# Patient Record
Sex: MALE | Race: BLACK OR AFRICAN AMERICAN | NOT HISPANIC OR LATINO | Employment: OTHER | URBAN - METROPOLITAN AREA
[De-identification: names, ages, dates, MRNs, and addresses within clinical notes are randomized per-mention and may not be internally consistent; named-entity substitution may affect disease eponyms.]

---

## 2021-01-19 PROBLEM — G93.40 ACUTE ENCEPHALOPATHY: Status: ACTIVE | Noted: 2019-10-27

## 2021-01-19 PROBLEM — E11.65 TYPE 2 DIABETES MELLITUS WITH HYPERGLYCEMIA: Status: ACTIVE | Noted: 2021-01-19

## 2021-01-19 PROBLEM — I63.9 CVA (CEREBRAL VASCULAR ACCIDENT): Status: ACTIVE | Noted: 2019-10-16

## 2021-01-19 PROBLEM — G40.909 SEIZURE DISORDER: Status: ACTIVE | Noted: 2021-01-19

## 2021-01-19 PROBLEM — Z93.1 S/P PERCUTANEOUS ENDOSCOPIC GASTROSTOMY (PEG) TUBE PLACEMENT: Status: ACTIVE | Noted: 2021-01-19

## 2021-01-19 PROBLEM — I10 ESSENTIAL HYPERTENSION: Status: ACTIVE | Noted: 2019-10-26

## 2021-01-19 PROBLEM — K56.2 VOLVULUS: Status: ACTIVE | Noted: 2021-01-19

## 2021-01-19 PROBLEM — J96.01 ACUTE RESPIRATORY FAILURE WITH HYPOXIA: Status: ACTIVE | Noted: 2021-01-19

## 2021-01-21 PROBLEM — M89.9 LYTIC BONE LESION OF FEMUR: Status: ACTIVE | Noted: 2021-01-21

## 2021-01-21 PROBLEM — K63.9 SIGMOID THICKENING: Status: ACTIVE | Noted: 2021-01-21

## 2021-01-21 PROBLEM — M89.8X5 LYTIC BONE LESION OF FEMUR: Status: ACTIVE | Noted: 2021-01-21

## 2024-03-04 ENCOUNTER — HOSPITAL ENCOUNTER (INPATIENT)
Facility: HOSPITAL | Age: 69
LOS: 2 days | Discharge: HOME HEALTH | DRG: 178 | End: 2024-03-06
Attending: STUDENT IN AN ORGANIZED HEALTH CARE EDUCATION/TRAINING PROGRAM | Admitting: INTERNAL MEDICINE
Payer: MEDICARE

## 2024-03-04 DIAGNOSIS — R06.02 SOB (SHORTNESS OF BREATH): ICD-10-CM

## 2024-03-04 DIAGNOSIS — J18.9 PNEUMONIA: ICD-10-CM

## 2024-03-04 DIAGNOSIS — I82.409 DVT (DEEP VENOUS THROMBOSIS): ICD-10-CM

## 2024-03-04 DIAGNOSIS — R09.02 HYPOXIA: ICD-10-CM

## 2024-03-04 DIAGNOSIS — J69.0 ASPIRATION PNEUMONIA, UNSPECIFIED ASPIRATION PNEUMONIA TYPE, UNSPECIFIED LATERALITY, UNSPECIFIED PART OF LUNG: ICD-10-CM

## 2024-03-04 DIAGNOSIS — J96.01 ACUTE RESPIRATORY FAILURE WITH HYPOXIA: Primary | ICD-10-CM

## 2024-03-04 LAB
ALBUMIN SERPL-MCNC: 3.2 G/DL (ref 3.4–4.8)
ALBUMIN/GLOB SERPL: 0.7 RATIO (ref 1.1–2)
ALP SERPL-CCNC: 216 UNIT/L (ref 40–150)
ALT SERPL-CCNC: 69 UNIT/L (ref 0–55)
APPEARANCE UR: CLEAR
APTT PPP: 29.6 SECONDS (ref 23.2–33.7)
AST SERPL-CCNC: 44 UNIT/L (ref 5–34)
BACTERIA #/AREA URNS AUTO: ABNORMAL /HPF
BASOPHILS # BLD AUTO: 0.06 X10(3)/MCL
BASOPHILS NFR BLD AUTO: 0.5 %
BILIRUB SERPL-MCNC: 0.9 MG/DL
BILIRUB UR QL STRIP.AUTO: NEGATIVE
BNP BLD-MCNC: 105.6 PG/ML
BUN SERPL-MCNC: 14.7 MG/DL (ref 8.4–25.7)
C DIFF TOX A+B STL QL IA: NEGATIVE
CALCIUM SERPL-MCNC: 8.9 MG/DL (ref 8.8–10)
CHLORIDE SERPL-SCNC: 99 MMOL/L (ref 98–107)
CLOSTRIDIUM DIFFICILE GDH ANTIGEN (OHS): NEGATIVE
CO2 SERPL-SCNC: 22 MMOL/L (ref 23–31)
COLOR UR AUTO: YELLOW
CREAT SERPL-MCNC: 0.82 MG/DL (ref 0.73–1.18)
EOSINOPHIL # BLD AUTO: 0.19 X10(3)/MCL (ref 0–0.9)
EOSINOPHIL NFR BLD AUTO: 1.5 %
ERYTHROCYTE [DISTWIDTH] IN BLOOD BY AUTOMATED COUNT: 14.6 % (ref 11.5–17)
EST. AVERAGE GLUCOSE BLD GHB EST-MCNC: 142.7 MG/DL
FECAL LEUKOCYTE (OHS): NEGATIVE
FLUAV AG UPPER RESP QL IA.RAPID: NOT DETECTED
FLUBV AG UPPER RESP QL IA.RAPID: NOT DETECTED
GFR SERPLBLD CREATININE-BSD FMLA CKD-EPI: >60 MLS/MIN/1.73/M2
GLOBULIN SER-MCNC: 4.4 GM/DL (ref 2.4–3.5)
GLUCOSE SERPL-MCNC: 233 MG/DL (ref 82–115)
GLUCOSE UR QL STRIP.AUTO: NORMAL
HBA1C MFR BLD: 6.6 %
HCT VFR BLD AUTO: 46.3 % (ref 42–52)
HGB BLD-MCNC: 14.5 G/DL (ref 14–18)
IMM GRANULOCYTES # BLD AUTO: 0.04 X10(3)/MCL (ref 0–0.04)
IMM GRANULOCYTES NFR BLD AUTO: 0.3 %
INR PPP: 1.2
KETONES UR QL STRIP.AUTO: NEGATIVE
LEUKOCYTE ESTERASE UR QL STRIP.AUTO: NEGATIVE
LYMPHOCYTES # BLD AUTO: 0.56 X10(3)/MCL (ref 0.6–4.6)
LYMPHOCYTES NFR BLD AUTO: 4.4 %
MCH RBC QN AUTO: 25.9 PG (ref 27–31)
MCHC RBC AUTO-ENTMCNC: 31.3 G/DL (ref 33–36)
MCV RBC AUTO: 82.8 FL (ref 80–94)
MONOCYTES # BLD AUTO: 0.83 X10(3)/MCL (ref 0.1–1.3)
MONOCYTES NFR BLD AUTO: 6.6 %
MRSA PCR SCRN (OHS): NOT DETECTED
NEUTROPHILS # BLD AUTO: 10.92 X10(3)/MCL (ref 2.1–9.2)
NEUTROPHILS NFR BLD AUTO: 86.7 %
NITRITE UR QL STRIP.AUTO: NEGATIVE
NRBC BLD AUTO-RTO: 0 %
PH UR STRIP.AUTO: 6 [PH]
PLATELET # BLD AUTO: 240 X10(3)/MCL (ref 130–400)
PMV BLD AUTO: 10.2 FL (ref 7.4–10.4)
POCT GLUCOSE: 233 MG/DL (ref 70–110)
POTASSIUM SERPL-SCNC: 4.2 MMOL/L (ref 3.5–5.1)
PROT SERPL-MCNC: 7.6 GM/DL (ref 5.8–7.6)
PROT UR QL STRIP.AUTO: ABNORMAL
PROTHROMBIN TIME: 14.9 SECONDS (ref 12.5–14.5)
RBC # BLD AUTO: 5.59 X10(6)/MCL (ref 4.7–6.1)
RBC #/AREA URNS AUTO: ABNORMAL /HPF
RBC UR QL AUTO: NEGATIVE
SARS-COV-2 RNA RESP QL NAA+PROBE: NOT DETECTED
SODIUM SERPL-SCNC: 135 MMOL/L (ref 136–145)
SP GR UR STRIP.AUTO: 1.02 (ref 1–1.03)
SQUAMOUS #/AREA URNS LPF: ABNORMAL /HPF
UROBILINOGEN UR STRIP-ACNC: NORMAL
WBC # SPEC AUTO: 12.6 X10(3)/MCL (ref 4.5–11.5)
WBC #/AREA URNS AUTO: ABNORMAL /HPF

## 2024-03-04 PROCEDURE — 96374 THER/PROPH/DIAG INJ IV PUSH: CPT

## 2024-03-04 PROCEDURE — 89055 LEUKOCYTE ASSESSMENT FECAL: CPT | Performed by: INTERNAL MEDICINE

## 2024-03-04 PROCEDURE — 25000003 PHARM REV CODE 250: Performed by: STUDENT IN AN ORGANIZED HEALTH CARE EDUCATION/TRAINING PROGRAM

## 2024-03-04 PROCEDURE — 63600175 PHARM REV CODE 636 W HCPCS: Performed by: STUDENT IN AN ORGANIZED HEALTH CARE EDUCATION/TRAINING PROGRAM

## 2024-03-04 PROCEDURE — 25000003 PHARM REV CODE 250: Performed by: NURSE PRACTITIONER

## 2024-03-04 PROCEDURE — 85025 COMPLETE CBC W/AUTO DIFF WBC: CPT | Performed by: STUDENT IN AN ORGANIZED HEALTH CARE EDUCATION/TRAINING PROGRAM

## 2024-03-04 PROCEDURE — 85610 PROTHROMBIN TIME: CPT | Performed by: STUDENT IN AN ORGANIZED HEALTH CARE EDUCATION/TRAINING PROGRAM

## 2024-03-04 PROCEDURE — 81001 URINALYSIS AUTO W/SCOPE: CPT | Performed by: STUDENT IN AN ORGANIZED HEALTH CARE EDUCATION/TRAINING PROGRAM

## 2024-03-04 PROCEDURE — 96361 HYDRATE IV INFUSION ADD-ON: CPT

## 2024-03-04 PROCEDURE — 0240U COVID/FLU A&B PCR: CPT | Performed by: STUDENT IN AN ORGANIZED HEALTH CARE EDUCATION/TRAINING PROGRAM

## 2024-03-04 PROCEDURE — 83036 HEMOGLOBIN GLYCOSYLATED A1C: CPT | Performed by: NURSE PRACTITIONER

## 2024-03-04 PROCEDURE — 80053 COMPREHEN METABOLIC PANEL: CPT | Performed by: STUDENT IN AN ORGANIZED HEALTH CARE EDUCATION/TRAINING PROGRAM

## 2024-03-04 PROCEDURE — 99285 EMERGENCY DEPT VISIT HI MDM: CPT | Mod: 25

## 2024-03-04 PROCEDURE — 27000207 HC ISOLATION

## 2024-03-04 PROCEDURE — 86318 IA INFECTIOUS AGENT ANTIBODY: CPT | Performed by: INTERNAL MEDICINE

## 2024-03-04 PROCEDURE — 83880 ASSAY OF NATRIURETIC PEPTIDE: CPT | Performed by: STUDENT IN AN ORGANIZED HEALTH CARE EDUCATION/TRAINING PROGRAM

## 2024-03-04 PROCEDURE — 87641 MR-STAPH DNA AMP PROBE: CPT | Performed by: NURSE PRACTITIONER

## 2024-03-04 PROCEDURE — 21400001 HC TELEMETRY ROOM

## 2024-03-04 PROCEDURE — 87040 BLOOD CULTURE FOR BACTERIA: CPT | Performed by: STUDENT IN AN ORGANIZED HEALTH CARE EDUCATION/TRAINING PROGRAM

## 2024-03-04 PROCEDURE — 87633 RESP VIRUS 12-25 TARGETS: CPT | Performed by: INTERNAL MEDICINE

## 2024-03-04 PROCEDURE — 63600175 PHARM REV CODE 636 W HCPCS: Performed by: INTERNAL MEDICINE

## 2024-03-04 PROCEDURE — 63600175 PHARM REV CODE 636 W HCPCS: Performed by: NURSE PRACTITIONER

## 2024-03-04 PROCEDURE — 11000001 HC ACUTE MED/SURG PRIVATE ROOM

## 2024-03-04 PROCEDURE — 85730 THROMBOPLASTIN TIME PARTIAL: CPT | Performed by: STUDENT IN AN ORGANIZED HEALTH CARE EDUCATION/TRAINING PROGRAM

## 2024-03-04 PROCEDURE — 25000003 PHARM REV CODE 250: Performed by: INTERNAL MEDICINE

## 2024-03-04 RX ORDER — LEVETIRACETAM 100 MG/ML
1000 SOLUTION ORAL 2 TIMES DAILY
Status: DISCONTINUED | OUTPATIENT
Start: 2024-03-04 | End: 2024-03-04

## 2024-03-04 RX ORDER — ASCORBIC ACID 500 MG
500 TABLET ORAL DAILY
COMMUNITY

## 2024-03-04 RX ORDER — ONDANSETRON HYDROCHLORIDE 2 MG/ML
4 INJECTION, SOLUTION INTRAVENOUS
Status: COMPLETED | OUTPATIENT
Start: 2024-03-04 | End: 2024-03-04

## 2024-03-04 RX ORDER — SODIUM CHLORIDE 9 MG/ML
INJECTION, SOLUTION INTRAVENOUS CONTINUOUS
Status: DISCONTINUED | OUTPATIENT
Start: 2024-03-04 | End: 2024-03-06 | Stop reason: HOSPADM

## 2024-03-04 RX ORDER — ACETAMINOPHEN 325 MG/1
650 TABLET ORAL EVERY 8 HOURS PRN
Status: DISCONTINUED | OUTPATIENT
Start: 2024-03-04 | End: 2024-03-06 | Stop reason: HOSPADM

## 2024-03-04 RX ORDER — ZINC GLUCONATE 50 MG
50 TABLET ORAL DAILY
COMMUNITY

## 2024-03-04 RX ORDER — ENOXAPARIN SODIUM 100 MG/ML
40 INJECTION SUBCUTANEOUS EVERY 24 HOURS
Status: DISCONTINUED | OUTPATIENT
Start: 2024-03-04 | End: 2024-03-06 | Stop reason: HOSPADM

## 2024-03-04 RX ORDER — ONDANSETRON HYDROCHLORIDE 2 MG/ML
4 INJECTION, SOLUTION INTRAVENOUS EVERY 8 HOURS PRN
Status: DISCONTINUED | OUTPATIENT
Start: 2024-03-04 | End: 2024-03-06 | Stop reason: HOSPADM

## 2024-03-04 RX ORDER — LANSOPRAZOLE 30 MG/1
30 TABLET, ORALLY DISINTEGRATING, DELAYED RELEASE ORAL DAILY
COMMUNITY

## 2024-03-04 RX ORDER — POLYETHYLENE GLYCOL 3350 17 G/17G
17 POWDER, FOR SOLUTION ORAL 2 TIMES DAILY PRN
Status: DISCONTINUED | OUTPATIENT
Start: 2024-03-04 | End: 2024-03-06 | Stop reason: HOSPADM

## 2024-03-04 RX ORDER — ACETAMINOPHEN 325 MG/1
650 TABLET ORAL EVERY 4 HOURS PRN
Status: DISCONTINUED | OUTPATIENT
Start: 2024-03-04 | End: 2024-03-06 | Stop reason: HOSPADM

## 2024-03-04 RX ORDER — INSULIN ASPART 100 [IU]/ML
1-10 INJECTION, SOLUTION INTRAVENOUS; SUBCUTANEOUS EVERY 6 HOURS PRN
Status: DISCONTINUED | OUTPATIENT
Start: 2024-03-04 | End: 2024-03-06 | Stop reason: HOSPADM

## 2024-03-04 RX ORDER — LEVETIRACETAM 10 MG/ML
1000 INJECTION INTRAVASCULAR EVERY 12 HOURS
Status: DISCONTINUED | OUTPATIENT
Start: 2024-03-04 | End: 2024-03-06 | Stop reason: HOSPADM

## 2024-03-04 RX ORDER — GLUCAGON 1 MG
1 KIT INJECTION
Status: DISCONTINUED | OUTPATIENT
Start: 2024-03-04 | End: 2024-03-06 | Stop reason: HOSPADM

## 2024-03-04 RX ADMIN — SODIUM CHLORIDE, POTASSIUM CHLORIDE, SODIUM LACTATE AND CALCIUM CHLORIDE 1000 ML: 600; 310; 30; 20 INJECTION, SOLUTION INTRAVENOUS at 12:03

## 2024-03-04 RX ADMIN — ONDANSETRON 4 MG: 2 INJECTION INTRAMUSCULAR; INTRAVENOUS at 02:03

## 2024-03-04 RX ADMIN — PIPERACILLIN AND TAZOBACTAM 4.5 G: 4; .5 INJECTION, POWDER, LYOPHILIZED, FOR SOLUTION INTRAVENOUS; PARENTERAL at 02:03

## 2024-03-04 RX ADMIN — VANCOMYCIN HYDROCHLORIDE 2000 MG: 500 INJECTION, POWDER, LYOPHILIZED, FOR SOLUTION INTRAVENOUS at 02:03

## 2024-03-04 RX ADMIN — ENOXAPARIN SODIUM 40 MG: 40 INJECTION SUBCUTANEOUS at 05:03

## 2024-03-04 RX ADMIN — INSULIN ASPART 4 UNITS: 100 INJECTION, SOLUTION INTRAVENOUS; SUBCUTANEOUS at 07:03

## 2024-03-04 RX ADMIN — PIPERACILLIN AND TAZOBACTAM 4.5 G: 4; .5 INJECTION, POWDER, LYOPHILIZED, FOR SOLUTION INTRAVENOUS; PARENTERAL at 10:03

## 2024-03-04 RX ADMIN — SODIUM CHLORIDE: 9 INJECTION, SOLUTION INTRAVENOUS at 03:03

## 2024-03-04 RX ADMIN — LEVETIRACETAM INJECTION 1000 MG: 10 INJECTION INTRAVENOUS at 11:03

## 2024-03-04 NOTE — ED PROVIDER NOTES
Encounter Date: 3/4/2024    SCRIBE #1 NOTE: I, Virginiechhaya Santiago, am scribing for, and in the presence of,  Tunde Hammond MD. I have scribed the following portions of the note - Other sections scribed: HPI, ROS, PE, MDM.       History     Chief Complaint   Patient presents with    Vomiting     Arrives via EMS for n/v that started yesterday. Hx TBI, CVA & quadriplegia. Nonverbal. GCS 7 at baseline     68 year old male with a history of TBI, HTN, DM, and seizures presents to ED, via EMS, with his sister (caregiver) who is reporting nausea and vomiting.  Pt's caregiver, at bedside, says that he usually has a pureed diet, but she was out of town and someone else took care of him.  She says that he has vomited up what looks like chunks of scrambled eggs, so she is worried that perhaps his food was not pureed enough.  She is also reporting swollen legs and temp of 100.  She says the patient is nonverbal and does not walk.    The history is provided by a caregiver. The history is limited by the condition of the patient.     Review of patient's allergies indicates:  No Known Allergies  Past Medical History:   Diagnosis Date    Diabetes mellitus     Hypertension     Seizures     Stroke     2019 right sided weakness/aphasia     No past surgical history on file.  No family history on file.  Social History     Tobacco Use    Smoking status: Never    Smokeless tobacco: Never   Substance Use Topics    Alcohol use: Not Currently    Drug use: Not Currently     Review of Systems   Unable to perform ROS: Patient nonverbal       Physical Exam     Initial Vitals [03/04/24 1201]   BP Pulse Resp Temp SpO2   (!) 154/83 102 16 99.5 °F (37.5 °C) 95 %      MAP       --         Physical Exam    Nursing note and vitals reviewed.  Constitutional: He is not diaphoretic. He appears distressed.   Paraplegic    HENT:   Head: Normocephalic and atraumatic.   Eyes: Conjunctivae are normal.   Neck:   Normal range of motion.  Cardiovascular:  Normal  heart sounds and intact distal pulses.           No murmur heard.  Tachycardic   Pulmonary/Chest: He is in respiratory distress. He has no wheezes. He has no rales.   Crackles in bases bilaterally    Abdominal: Abdomen is soft. He exhibits no distension. There is no abdominal tenderness.   Musculoskeletal:         General: No tenderness.      Cervical back: Normal range of motion.      Right lower le+ Edema present.      Left lower le+ Edema present.     Neurological: He is alert.   Skin: Skin is warm and dry. Capillary refill takes less than 2 seconds. No rash noted. No erythema.         ED Course   Critical Care    Date/Time: 3/4/2024 1:15 PM    Performed by: Tunde Hammond MD  Authorized by: Tunde Hammond MD  Direct patient critical care time: 8 minutes  Additional history critical care time: 12 minutes  Ordering / reviewing critical care time: 5 minutes  Documentation critical care time: 6 minutes  Consulting other physicians critical care time: 5 minutes  Total critical care time (exclusive of procedural time) : 36 minutes  Critical care time was exclusive of separately billable procedures and treating other patients and teaching time.  Critical care was necessary to treat or prevent imminent or life-threatening deterioration of the following conditions: respiratory failure.  Critical care was time spent personally by me on the following activities: development of treatment plan with patient or surrogate, discussions with consultants, interpretation of cardiac output measurements, evaluation of patient's response to treatment, examination of patient, obtaining history from patient or surrogate, ordering and performing treatments and interventions, ordering and review of laboratory studies, ordering and review of radiographic studies, pulse oximetry, re-evaluation of patient's condition and review of old charts.        Labs Reviewed   COMPREHENSIVE METABOLIC PANEL - Abnormal; Notable for the  following components:       Result Value    Sodium Level 135 (*)     Carbon Dioxide 22 (*)     Glucose Level 233 (*)     Albumin Level 3.2 (*)     Globulin 4.4 (*)     Albumin/Globulin Ratio 0.7 (*)     Alkaline Phosphatase 216 (*)     Alanine Aminotransferase 69 (*)     Aspartate Aminotransferase 44 (*)     All other components within normal limits   CBC WITH DIFFERENTIAL - Abnormal; Notable for the following components:    WBC 12.60 (*)     MCH 25.9 (*)     MCHC 31.3 (*)     Lymph # 0.56 (*)     Neut # 10.92 (*)     All other components within normal limits   PROTIME-INR - Abnormal; Notable for the following components:    PT 14.9 (*)     All other components within normal limits   URINALYSIS, REFLEX TO URINE CULTURE - Abnormal; Notable for the following components:    Protein, UA Trace (*)     All other components within normal limits   B-TYPE NATRIURETIC PEPTIDE - Abnormal; Notable for the following components:    Natriuretic Peptide 105.6 (*)     All other components within normal limits   POCT GLUCOSE - Abnormal; Notable for the following components:    POCT Glucose 233 (*)     All other components within normal limits   APTT - Normal   COVID/FLU A&B PCR - Normal    Narrative:     The Xpert Xpress SARS-CoV-2/FLU/RSV plus is a rapid, multiplexed real-time PCR test intended for the simultaneous qualitative detection and differentiation of SARS-CoV-2, Influenza A, Influenza B, and respiratory syncytial virus (RSV) viral RNA in either nasopharyngeal swab or nasal swab specimens.         MRSA PCR - Normal    Narrative:     The Xpert MRSA Assay utilizes automated real-time polymerase chain reaction (PCR) to detect MRSA DNA.  A positive test result does not necessarily indicate the presence of viable organism.  It is however, presumptive for the presence of MRSA.   CBC W/ AUTO DIFFERENTIAL    Narrative:     The following orders were created for panel order CBC auto differential.  Procedure                                Abnormality         Status                     ---------                               -----------         ------                     CBC with Differential[[id number]]        Abnormal            Final result                 Please view results for these tests on the individual orders.   HEMOGLOBIN A1C          Imaging Results              US Abdomen Limited_Liver (Final result)  Result time 03/04/24 17:33:30      Final result by Vivek Seymour MD (03/04/24 17:33:30)                   Impression:      Cholelithiasis with no biliary dilatation or sonographic findings for cholecystitis.    Mild hepatomegaly.    Midline structures largely obscured.      Electronically signed by: Vivek Seymour  Date:    03/04/2024  Time:    17:33               Narrative:    EXAMINATION:  US ABDOMEN LIMITED_LIVER    CLINICAL HISTORY:  Abnormal LFTs;    TECHNIQUE:  Gray-scale and color Doppler ultrasound images of the abdomen.    COMPARISON:  CT 01/20/2021    FINDINGS:  Obscured IVC and pancreas.  Portions of the left liver also obscured by bowel gas.    Mild hepatomegaly.  Main portal vein patent with normal flow direction.  Few gallstones.  No gallbladder wall thickening or ascites.  Negative sonographic Torre sign.  Normal CBD caliber.    No hydronephrosis or defined calcification in the right kidney.    Measurements:    - Liver: 17.9 cm    - CBD diameter: 2 mm    - Right kidney: 8.3 cm in length                                       X-Ray Chest AP Portable (Final result)  Result time 03/04/24 13:51:08      Final result by Cornel Cruz MD (03/04/24 13:51:08)                   Impression:      Poor inspiratory effort accentuates the pulmonary and vascular markings.    Confluent opacities in the left retrocardiac region with partial silhouetting of the left hemidiaphragm which might be related to an infiltrate/atelectasis      Electronically signed by: Cornel Cruz  Date:    03/04/2024  Time:    13:51                Narrative:    EXAMINATION:  XR CHEST AP PORTABLE    CLINICAL HISTORY:  Shortness of breath    TECHNIQUE:  Single frontal view of the chest was performed.    COMPARISON:  January 21, 2021    FINDINGS:  Examination reveals mediastinal silhouette to be within normal limits cardiac silhouette is at the upper limits of normal.    There is a poor inspiratory effort that accentuates the pulmonary vascular markings.    Confluent opacities identified in the left retrocardiac region with silhouetting of the left hemidiaphragm which might be related to an infiltrate/atelectasis.    No other focal consolidative changes                                       Medications   lactated ringers bolus 1,000 mL (0 mLs Intravenous Stopped 3/4/24 1330)   ondansetron injection 4 mg (4 mg Intravenous Given 3/4/24 1441)   vancomycin 2 g in dextrose 5 % 500 mL IVPB (0 mg Intravenous Stopped 3/4/24 1600)   piperacillin-tazobactam (ZOSYN) 4.5 g in dextrose 5 % in water (D5W) 100 mL IVPB (MB+) (0 g Intravenous Stopped 3/4/24 1512)     Medical Decision Making  Problems Addressed:  Aspiration pneumonia, unspecified aspiration pneumonia type, unspecified laterality, unspecified part of lung: acute illness or injury that poses a threat to life or bodily functions  Hypoxia: acute illness or injury that poses a threat to life or bodily functions  Pneumonia: acute illness or injury that poses a threat to life or bodily functions  SOB (shortness of breath): acute illness or injury that poses a threat to life or bodily functions    Amount and/or Complexity of Data Reviewed  Independent Historian: caregiver     Details: Pt's caregiver, at bedside, says that he usually has a pureed diet, but she was out of town and someone else took care of him.  She says that he has vomited up what looks like chunks of scrambled eggs, so she is worried that perhaps his food was not pureed enough.  She is also reporting swollen legs and temp of 100.  She says the patient is  nonverbal and does not walk.  Labs: ordered.  Radiology: ordered and independent interpretation performed.  ECG/medicine tests: ordered and independent interpretation performed.    Risk  Prescription drug management.  Parenteral controlled substances.  Decision regarding hospitalization.  Diagnosis or treatment significantly limited by social determinants of health.            Scribe Attestation:   Scribe #1: I performed the above scribed service and the documentation accurately describes the services I performed. I attest to the accuracy of the note.    Attending Attestation:           Physician Attestation for Scribe:  Physician Attestation Statement for Scribe #1: I, Tunde Hammond MD, reviewed documentation, as scribed by Virginie Santiago in my presence, and it is both accurate and complete.             ED Course as of 03/28/24 1802   Mon Mar 04, 2024   1350 Paged [facility] medicine. [BP]      ED Course User Index  [BP] Virginie Santiago               Medical Decision Making:   History:   I obtained history from: someone other than patient.       <> Summary of History: Collateral history obtained from the patient's daughter.  Old Medical Records: I decided to obtain old medical records.  Old Records Summarized: records from clinic visits, records from previous admission(s) and records from another hospital.       <> Summary of Records: Reviewed old records history of CVA, seizures in past  Initial Assessment:   SOB  Differential Diagnosis:   Judging by the patient's chief complaint and pertinent history, the patient has the following possible differential diagnoses, including but not limited to the following.  Some of these are deemed to be lower likelihood and some more likely based on my physical exam and history combined with possible lab work and/or imaging studies.   Please see the pertinent studies, and refer to the HPI.  Some of these diagnoses will take further evaluation to fully rule out, perhaps  as an outpatient and the patient was encouraged to follow up when discharged for more comprehensive evaluation.    Aspiration pneumonia, COVID/Flu, congestive heart failure, asthma, COPD, pleural effusion, pulmonary edema, acute bronchitis, aspiration pneumonia  Independently Interpreted Test(s):   I have ordered and independently interpreted X-rays - see prior notes.  Clinical Tests:   Lab Tests: Ordered and Reviewed  Radiological Study: Ordered and Reviewed  ED Management:  Patient is a 68-year-old male history paraplegia, CVA, seizure disorder presents to the ED for vomiting concern for aspiration pneumonia.  Patient reportedly with temperature of 100° prior to arrival.  On arrival 99.5 he was tachycardic.  Requiring 2 L of oxygen.  Crackles at the bases auscultated.  Chest x-ray obtained which showed infiltrate likely aspiration pneumonia.  Blood cultures obtained started on antibiotics.  All discussed with hospital medicine for admission given that the patient is requiring oxygen.  All results discussed with patient and daughter.  Answered all questions at this time.  Verbalized understanding and agreed to plan.  Other:   I have discussed this case with another health care provider.       <> Summary of the Discussion: Discussed case with hospital medicine who will admit the patient.      ·  There is no evidence of a right lower extremity DVT.  ·  There is no evidence of a left lower extremity DVT.     There was no evidence of deep or superficial vein thrombosis in the bilateral lower extremities.                   Clinical Impression:  Final diagnoses:  [R06.02] SOB (shortness of breath)  [J18.9] Pneumonia  [J69.0] Aspiration pneumonia, unspecified aspiration pneumonia type, unspecified laterality, unspecified part of lung          ED Disposition Condition    Admit Stable                Tunde Hammond MD  03/28/24 8152

## 2024-03-04 NOTE — H&P
[facility]  Hospital Medicine History & Physical Examination       Patient Name: Tito Wells  MRN: [id number]  Patient Class: Emergency   Admission Date: 03/04/2024   Admitting Service: Hospital Medicine   Length of Stay: 0  Attending Physician: Dr. Dior  Primary Care Provider: Yudith Burnett MD  Face-to-Face encounter date: 03/04/2024  Code Status: Full  Chief Complaint: Vomiting (Arrives via EMS for n/v that started yesterday. Hx TBI, CVA & quadriplegia. Nonverbal. GCS 7 at baseline)      Patient information was obtained from patient, patient's family, past medical records and ER records.      HISTORY OF PRESENT ILLNESS:   Tito Wells is a 68 y.o. 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 He is nonverbal and bed bound at baseline.     Upon presentation to ED, vital signed included /83, , RR 16, SpO2 94% on room air, temperature 99.5° F. Labs notable for WBC 12.6, sodium 135, CO2 22, glucose 233, , albumin 3.2, AST 44, ALT 69, .6.  Influenza and COVID-19 negative.  Urinalysis with trace protein.  CXR demonstrated poor inspiratory effort accentuates the pulmonary vascular markings, confluence opacities in the left retrocardiac region with partial silhouetting of the left hemidiaphragm which may be related to an infiltrate/atelectasis.  He was started on broad-spectrum IV antibiotics with vancomycin and Zosyn.  He was admitted to hospital medicine services for further medical management.    REVIEW OF SYSTEMS:   Limited secondary to current clinical condition    PAST MEDICAL HISTORY:   Essential Hypertension  Type 2 DM  Seizure disorder  History of TBI   History of CVA with residual right-sided deficits and aphasia    PAST SURGICAL HISTORY:   PEG tube placement s/p removal  Tracheostomy s/p removal    Colonoscopy    FAMILY HISTORY:   Reviewed and negative    SOCIAL HISTORY:   Denied alcohol, tobacco or illicit drug use    ALLERGIES:   Patient has no known allergies.    HOME MEDICATIONS:     Prior to Admission medications    Medication Sig Start Date End Date Taking? Authorizing Provider   ergocalciferol (ERGOCALCIFEROL) 50,000 unit Cap 1 capsule (50,000 Units total) by Per G Tube route every 7 days. 1/28/21   Zander Sheth MD   hydroCHLOROthiazide (HYDRODIURIL) 25 MG tablet 25 mg by Per G Tube route once daily.    Provider, Historical   levETIRAcetam (KEPPRA) 100 mg/mL Soln 1,000 mg by Per G Tube route 2 (two) times daily.     Provider, Historical   melatonin (MELATIN) 3 mg tablet 2 tablets (6 mg total) by Per G Tube route nightly as needed for Insomnia. 1/22/21   Zander Sheth MD   polyethylene glycol (GLYCOLAX) 17 gram PwPk 17 g by Per G Tube route 3 (three) times daily. 1/22/21   Zander Sheth MD   potassium bicarbonate disintegrating tablet 2 tablets (20 mEq total) by Per G Tube route once daily. 1/22/21   Zander Sheth MD   rivaroxaban (XARELTO) 10 mg Tab 10 mg by Per G Tube route once daily.     Provider, Historical     ________________________________________________________________________  INPATIENT LIST OF MEDICATIONS     Current Facility-Administered Medications:     lactated ringers bolus 1,000 mL, 1,000 mL, Intravenous, ED 1 Time, Tunde Hammond MD    ondansetron injection 4 mg, 4 mg, Intravenous, ED 1 Time, Tunde Hammond MD    piperacillin-tazobactam (ZOSYN) 4.5 g in dextrose 5 % in water (D5W) 100 mL IVPB (MB+), 4.5 g, Intravenous, ED 1 Time, Tunde Hammond MD    piperacillin-tazobactam (ZOSYN) 4.5 g in dextrose 5 % in water (D5W) 100 mL IVPB (MB+), 4.5 g, Intravenous, Q8H, Kiana Clark, [facility]IP-BC    vancomycin 2 g in dextrose 5 % 500 mL IVPB, 2,000 mg, Intravenous, ED 1 Time, Tunde Hammond MD    Current Outpatient Medications:     ergocalciferol (ERGOCALCIFEROL) 50,000  unit Cap, 1 capsule (50,000 Units total) by Per G Tube route every 7 days., Disp: 4 capsule, Rfl: 5    hydroCHLOROthiazide (HYDRODIURIL) 25 MG tablet, 25 mg by Per G Tube route once daily., Disp: , Rfl:     levETIRAcetam (KEPPRA) 100 mg/mL Soln, 1,000 mg by Per G Tube route 2 (two) times daily. , Disp: , Rfl:     melatonin (MELATIN) 3 mg tablet, 2 tablets (6 mg total) by Per G Tube route nightly as needed for Insomnia., Disp:  , Rfl: 0    polyethylene glycol (GLYCOLAX) 17 gram PwPk, 17 g by Per G Tube route 3 (three) times daily., Disp: 100 each, Rfl: 0    potassium bicarbonate disintegrating tablet, 2 tablets (20 mEq total) by Per G Tube route once daily., Disp: 7 tablet, Rfl: 0    rivaroxaban (XARELTO) 10 mg Tab, 10 mg by Per G Tube route once daily. , Disp: , Rfl:     Scheduled Meds:   lactated ringers  1,000 mL Intravenous ED 1 Time    ondansetron  4 mg Intravenous ED 1 Time    piperacillin-tazobactam (Zosyn) IV (PEDS and ADULTS) (extended infusion is not appropriate)  4.5 g Intravenous ED 1 Time    piperacillin-tazobactam (Zosyn) IV (PEDS and ADULTS) (extended infusion is not appropriate)  4.5 g Intravenous Q8H    vancomycin (VANCOCIN) IV (PEDS and ADULTS)  2,000 mg Intravenous ED 1 Time     Continuous Infusions:  PRN Meds:.    PHYSICAL EXAM:     VITAL SIGNS: 24 HRS MIN & MAX LAST   Temp  Min: 99.5 °F (37.5 °C)  Max: 99.5 °F (37.5 °C) 99.5 °F (37.5 °C)   BP  Min: 151/96  Max: 154/83 (!) 151/96   Pulse  Min: 102  Max: 116  (!) 116   Resp  Min: 16  Max: 37 (!) 37   SpO2  Min: 93 %  Max: 95 % (!) 93 %       Physical Exam per attending MD    LABS AND IMAGING:     Recent Labs   Lab 03/04/24  1227   WBC 12.60*   RBC 5.59   HGB 14.5   HCT 46.3   MCV 82.8   MCH 25.9*   MCHC 31.3*   RDW 14.6      MPV 10.2       Recent Labs   Lab 03/04/24  1227   *   K 4.2   CO2 22*   BUN 14.7   CREATININE 0.82   CALCIUM 8.9   ALBUMIN 3.2*   ALKPHOS 216*   ALT 69*   AST 44*   BILITOT 0.9       Microbiology Results (last 7  days)       Procedure Component Value Units Date/Time    Blood culture #1 **CANNOT BE ORDERED STAT** [[id number]] Collected: 03/04/24 1313    Order Status: Sent Specimen: Blood     Blood culture #2 **CANNOT BE ORDERED STAT** [[id number]] Collected: 03/04/24 1313    Order Status: Sent Specimen: Blood              X-Ray Chest AP Portable  Narrative: EXAMINATION:  XR CHEST AP PORTABLE    CLINICAL HISTORY:  Shortness of breath    TECHNIQUE:  Single frontal view of the chest was performed.    COMPARISON:  January 21, 2021    FINDINGS:  Examination reveals mediastinal silhouette to be within normal limits cardiac silhouette is at the upper limits of normal.    There is a poor inspiratory effort that accentuates the pulmonary vascular markings.    Confluent opacities identified in the left retrocardiac region with silhouetting of the left hemidiaphragm which might be related to an infiltrate/atelectasis.    No other focal consolidative changes  Impression: Poor inspiratory effort accentuates the pulmonary and vascular markings.    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      VTE Prophylaxis: Lovenox    Discharge Planning and Disposition: PJ CLINE, Kiana Clark, NP have reviewed and  discussed the case with Dr. Dior.  Please see the attending MD's addendum for further assessment and plan.    Kiana Clark, [facility]-BC  Department of Hospital Medicine   [facility]   03/04/2024    _______________________________________________________________________________  MD Addendum:        I Dr. Beth Dior performed substantive portion of the visit, personally performed a face to face evaluation of the patient and have reviewed and agree with NP/PA documentation, treatment and plan & MDM.     68-year-old  gentleman with above-mentioned medical history was brought to ER by her daughter his daughter due to complaints of fever, shortness of breath.  At baseline he is nonverbal, completely dependent, able to understand.  Peg tube and has been discontinued more than year ago and he is on pureed diet.  Patient's daughter is concerned if he was fed non pureed diet leading to aspiration and shortness of breadth.  At presentation he was tachycardic, T-max 99.5° and was hypoxic requiring oxygen mask ventilation and he was also hypertensive.  Chest x-ray showed confluent opacities in the left retrocardiac region suspicious for infiltrates.  He was fluid resuscitated, empiric antibiotics were added and admitted to hospital medicine service when seen at bedside he was at his baseline mental status able to understand and nod his head during communication.  Daughter was at bedside contributed to HPI.  Trend exam showed bilateral rhonchi with crackles at bases.  Has bilateral lower extremity edema.  Rest of the exam was benign.  Agree with the HPI, examined, plan mentioned above.  We will continue empiric antibiotics, follow infectious workup, obtain speech evaluation, resume home medications, obtain lower extremity Doppler to rule out DVT.  LFTs seems to be elevated chronically.  We will obtain ultrasound liver for further evaluation and avoid hepatotoxic  medications.  We will obtain CT thorax if shortness for breath and hypoxia continues.  Needs new PCP in [place] at discharge            03/04/2024

## 2024-03-04 NOTE — PROGRESS NOTES
"Pharmacokinetic Initial Assessment: IV Vancomycin    Assessment/Plan:    Initiate intravenous vancomycin with loading dose of 2000 mg once followed by a maintenance dose of vancomycin 1750mg IV every 12 hours  Desired empiric serum trough concentration is 15 to 20 mcg/mL  Draw vancomycin trough level 60 min prior to fourth dose on 03/06 at approximately 1600  Pharmacy will continue to follow and monitor vancomycin.      Please contact pharmacy at extension [phone] with any questions regarding this assessment.     Thank you for the consult,   Ana Arevalo       Patient brief summary:  Tito Wells is a 68 y.o. male initiated on antimicrobial therapy with IV Vancomycin for treatment of suspected lower respiratory infection    Drug Allergies:   Review of patient's allergies indicates:  No Known Allergies    Actual Body Weight:   97.1 kg    Renal Function:   Estimated Creatinine Clearance: 102.2 mL/min (based on SCr of 0.82 mg/dL).,     Dialysis Method (if applicable):  N/A    CBC (last 72 hours):  Recent Labs   Lab Result Units 03/04/24  1227   WBC x10(3)/mcL 12.60*   Hgb g/dL 14.5   Hemoglobin A1c % 6.6   Hct % 46.3   Platelet x10(3)/mcL 240   Mono % % 6.6   Eos % % 1.5   Basophil % % 0.5       Metabolic Panel (last 72 hours):  Recent Labs   Lab Result Units 03/04/24  1227 03/04/24  1256   Sodium Level mmol/L 135*  --    Potassium Level mmol/L 4.2  --    Chloride mmol/L 99  --    Carbon Dioxide mmol/L 22*  --    Glucose Level mg/dL 233*  --    Glucose, UA   --  Normal   Blood Urea Nitrogen mg/dL 14.7  --    Creatinine mg/dL 0.82  --    Albumin Level g/dL 3.2*  --    Bilirubin Total mg/dL 0.9  --    Alkaline Phosphatase unit/L 216*  --    Aspartate Aminotransferase unit/L 44*  --    Alanine Aminotransferase unit/L 69*  --        Drug levels (last 3 results):  No results for input(s): "VANCOMYCINRA", "VANCORANDOM", "VANCOMYCINPE", "VANCOPEAK", "VANCOMYCINTR", "VANCOTROUGH" in the last 72 hours.    Microbiologic " Results:  Microbiology Results (last 7 days)       Procedure Component Value Units Date/Time    Blood culture #1 **CANNOT BE ORDERED STAT** [[id number]] Collected: 03/04/24 1313    Order Status: Sent Specimen: Blood     Blood culture #2 **CANNOT BE ORDERED STAT** [[id number]] Collected: 03/04/24 1313    Order Status: Sent Specimen: Blood

## 2024-03-04 NOTE — Clinical Note
Diagnosis: Pneumonia [311602]   Future Attending Provider: ELIZABETH ESTES [[id number]]   Admit to which facility:: [facility] [[id number]]   Reason for IP Medical Treatment  (Clinical interventions that can only be accomplished in the IP setting? ) :: Pneumonia   I certify that Inpatient services for greater than or equal to 2 midnights are medically necessary:: Yes   Plans for Post-Acute care--if anticipated (pick the single best option):: A. No post acute care anticipated at this time

## 2024-03-05 LAB
ALBUMIN SERPL-MCNC: 2.8 G/DL (ref 3.4–4.8)
ALBUMIN/GLOB SERPL: 0.7 RATIO (ref 1.1–2)
ALP SERPL-CCNC: 169 UNIT/L (ref 40–150)
ALT SERPL-CCNC: 54 UNIT/L (ref 0–55)
AST SERPL-CCNC: 35 UNIT/L (ref 5–34)
B PERT.PT PRMT NPH QL NAA+NON-PROBE: NOT DETECTED
BASOPHILS # BLD AUTO: 0.03 X10(3)/MCL
BASOPHILS NFR BLD AUTO: 0.4 %
BILIRUB SERPL-MCNC: 0.8 MG/DL
BUN SERPL-MCNC: 13 MG/DL (ref 8.4–25.7)
C PNEUM DNA NPH QL NAA+NON-PROBE: NOT DETECTED
CALCIUM SERPL-MCNC: 8.5 MG/DL (ref 8.8–10)
CHLORIDE SERPL-SCNC: 104 MMOL/L (ref 98–107)
CHOLEST SERPL-MCNC: 113 MG/DL
CHOLEST/HDLC SERPL: 6 {RATIO} (ref 0–5)
CO2 SERPL-SCNC: 23 MMOL/L (ref 23–31)
CREAT SERPL-MCNC: 0.75 MG/DL (ref 0.73–1.18)
EOSINOPHIL # BLD AUTO: 0.08 X10(3)/MCL (ref 0–0.9)
EOSINOPHIL NFR BLD AUTO: 1 %
ERYTHROCYTE [DISTWIDTH] IN BLOOD BY AUTOMATED COUNT: 14.6 % (ref 11.5–17)
EST. AVERAGE GLUCOSE BLD GHB EST-MCNC: 145.6 MG/DL
GFR SERPLBLD CREATININE-BSD FMLA CKD-EPI: >60 MLS/MIN/1.73/M2
GLOBULIN SER-MCNC: 3.8 GM/DL (ref 2.4–3.5)
GLUCOSE SERPL-MCNC: 140 MG/DL (ref 82–115)
HADV DNA NPH QL NAA+NON-PROBE: NOT DETECTED
HBA1C MFR BLD: 6.7 %
HCOV 229E RNA NPH QL NAA+NON-PROBE: NOT DETECTED
HCOV HKU1 RNA NPH QL NAA+NON-PROBE: NOT DETECTED
HCOV NL63 RNA NPH QL NAA+NON-PROBE: NOT DETECTED
HCOV OC43 RNA NPH QL NAA+NON-PROBE: NOT DETECTED
HCT VFR BLD AUTO: 39.9 % (ref 42–52)
HDLC SERPL-MCNC: 18 MG/DL (ref 35–60)
HGB BLD-MCNC: 12.7 G/DL (ref 14–18)
HMPV RNA NPH QL NAA+NON-PROBE: NOT DETECTED
HPIV1 RNA NPH QL NAA+NON-PROBE: NOT DETECTED
HPIV2 RNA NPH QL NAA+NON-PROBE: NOT DETECTED
HPIV3 RNA NPH QL NAA+NON-PROBE: NOT DETECTED
HPIV4 RNA NPH QL NAA+NON-PROBE: NOT DETECTED
IMM GRANULOCYTES # BLD AUTO: 0.02 X10(3)/MCL (ref 0–0.04)
IMM GRANULOCYTES NFR BLD AUTO: 0.2 %
LDLC SERPL CALC-MCNC: 65 MG/DL (ref 50–140)
LYMPHOCYTES # BLD AUTO: 1.23 X10(3)/MCL (ref 0.6–4.6)
LYMPHOCYTES NFR BLD AUTO: 14.7 %
M PNEUMO DNA NPH QL NAA+NON-PROBE: NOT DETECTED
MCH RBC QN AUTO: 25.8 PG (ref 27–31)
MCHC RBC AUTO-ENTMCNC: 31.8 G/DL (ref 33–36)
MCV RBC AUTO: 81.1 FL (ref 80–94)
MONOCYTES # BLD AUTO: 0.93 X10(3)/MCL (ref 0.1–1.3)
MONOCYTES NFR BLD AUTO: 11.1 %
NEUTROPHILS # BLD AUTO: 6.1 X10(3)/MCL (ref 2.1–9.2)
NEUTROPHILS NFR BLD AUTO: 72.6 %
NRBC BLD AUTO-RTO: 0 %
PLATELET # BLD AUTO: 205 X10(3)/MCL (ref 130–400)
PMV BLD AUTO: 10.6 FL (ref 7.4–10.4)
POCT GLUCOSE: 172 MG/DL (ref 70–110)
POTASSIUM SERPL-SCNC: 4 MMOL/L (ref 3.5–5.1)
PROT SERPL-MCNC: 6.6 GM/DL (ref 5.8–7.6)
RBC # BLD AUTO: 4.92 X10(6)/MCL (ref 4.7–6.1)
RSV RNA NPH QL NAA+NON-PROBE: NOT DETECTED
RV+EV RNA NPH QL NAA+NON-PROBE: NOT DETECTED
SODIUM SERPL-SCNC: 139 MMOL/L (ref 136–145)
TRIGL SERPL-MCNC: 151 MG/DL (ref 34–140)
VLDLC SERPL CALC-MCNC: 30 MG/DL
WBC # SPEC AUTO: 8.39 X10(3)/MCL (ref 4.5–11.5)

## 2024-03-05 PROCEDURE — 63600175 PHARM REV CODE 636 W HCPCS: Performed by: INTERNAL MEDICINE

## 2024-03-05 PROCEDURE — 63600175 PHARM REV CODE 636 W HCPCS: Performed by: NURSE PRACTITIONER

## 2024-03-05 PROCEDURE — 80061 LIPID PANEL: CPT | Performed by: INTERNAL MEDICINE

## 2024-03-05 PROCEDURE — 25000003 PHARM REV CODE 250: Performed by: NURSE PRACTITIONER

## 2024-03-05 PROCEDURE — 83036 HEMOGLOBIN GLYCOSYLATED A1C: CPT | Performed by: INTERNAL MEDICINE

## 2024-03-05 PROCEDURE — 80053 COMPREHEN METABOLIC PANEL: CPT | Performed by: NURSE PRACTITIONER

## 2024-03-05 PROCEDURE — 25000003 PHARM REV CODE 250: Performed by: INTERNAL MEDICINE

## 2024-03-05 PROCEDURE — 27000221 HC OXYGEN, UP TO 24 HOURS

## 2024-03-05 PROCEDURE — 85025 COMPLETE CBC W/AUTO DIFF WBC: CPT | Performed by: NURSE PRACTITIONER

## 2024-03-05 PROCEDURE — 21400001 HC TELEMETRY ROOM

## 2024-03-05 PROCEDURE — 92610 EVALUATE SWALLOWING FUNCTION: CPT

## 2024-03-05 RX ORDER — LOPERAMIDE HYDROCHLORIDE 2 MG/1
2 CAPSULE ORAL 4 TIMES DAILY PRN
Status: DISCONTINUED | OUTPATIENT
Start: 2024-03-05 | End: 2024-03-06 | Stop reason: HOSPADM

## 2024-03-05 RX ADMIN — VANCOMYCIN HYDROCHLORIDE 1750 MG: 500 INJECTION, POWDER, LYOPHILIZED, FOR SOLUTION INTRAVENOUS at 05:03

## 2024-03-05 RX ADMIN — LOPERAMIDE HYDROCHLORIDE 2 MG: 2 CAPSULE ORAL at 04:03

## 2024-03-05 RX ADMIN — PIPERACILLIN AND TAZOBACTAM 4.5 G: 4; .5 INJECTION, POWDER, LYOPHILIZED, FOR SOLUTION INTRAVENOUS; PARENTERAL at 04:03

## 2024-03-05 RX ADMIN — LEVETIRACETAM INJECTION 1000 MG: 10 INJECTION INTRAVENOUS at 08:03

## 2024-03-05 RX ADMIN — Medication: at 04:03

## 2024-03-05 RX ADMIN — WHITE PETROLATUM: 1.75 OINTMENT TOPICAL at 04:03

## 2024-03-05 RX ADMIN — LOPERAMIDE HYDROCHLORIDE 2 MG: 2 CAPSULE ORAL at 09:03

## 2024-03-05 RX ADMIN — INSULIN ASPART 2 UNITS: 100 INJECTION, SOLUTION INTRAVENOUS; SUBCUTANEOUS at 04:03

## 2024-03-05 RX ADMIN — ENOXAPARIN SODIUM 40 MG: 40 INJECTION SUBCUTANEOUS at 04:03

## 2024-03-05 RX ADMIN — PIPERACILLIN AND TAZOBACTAM 4.5 G: 4; .5 INJECTION, POWDER, LYOPHILIZED, FOR SOLUTION INTRAVENOUS; PARENTERAL at 09:03

## 2024-03-05 NOTE — PT/OT/SLP EVAL
[facility]  Speech Language Pathology Department  Clinical Swallow Evaluation    Patient Name:  Tito Wells   MRN:  [id number]    Recommendations     General recommendations:  SLP follow up x1  Diet texture/consistency recommendations:  Puree solids (IDDSI 4) and moderately thick liquids (IDDSI 3)  Medications: crushed in puree  Swallow strategies/precautions: small bites/sips, slow rate, and 1:1 assist feed  Precautions: Standard,      History     Tito Wells is a/n 68 y.o. 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 with vomiting and nausea. Pt is nonverbal and bed bound at baseline.     Past Medical History:   Diagnosis Date    Diabetes mellitus     Hypertension     Seizures     Stroke     2019 right sided weakness/aphasia     Home diet texture/consistency: Puree and moderately thick liquids  Current method of nutrition: NPO    Imaging   Results for orders placed during the hospital encounter of 01/19/21    X-Ray Chest 1 View    Narrative  EXAMINATION:  XR CHEST 1 VIEW    CLINICAL HISTORY:  hx of granulomas/ nodules. with lytic lesions on femur, assess for malignancy, possible lung primary;.    COMPARISON:  10/19/2019 at 02:21.    TECHNIQUE:  Frontal view of the chest.    FINDINGS:  There has been interval removal of the ET tube, feeding tube and right internal jugular catheter.  Small calcified granuloma in the left upper lobe.  No evidence of acute confluent infiltrates.  Both costophrenic angles are sharp.  No definite pneumothorax noted trachea is midline.  Calcified aorta.  Cardiac silhouette is within normal limits.  Degenerative changes.  Healed right rib fracture.      Electronically signed by: Jag Dill  Date:    01/22/2021  Time:    09:24    Subjective     Patient awake.Pts daughter at bedside.   Spiritual/Cultural/Zoroastrian Beliefs/Practices that affect care: no  Pain/Comfort: Pain Rating 1: 0/10    Objective     ORAL  MUSCULATURE  Dentition: own teeth  Secretion Management: adequate  Mucosal Quality: good  Facial Movement: WFL  Buccal Strength & Mobility: WFL  Mandibular Strength & Mobility: WFL    Consistency Fed By Oral Symptoms Pharyngeal Symptoms   Moderately thick liquid by cup SLP None None   Puree SLP None None     Assessment     No signs/sx of aspiration observed. REC: initiate moderately thick liquids and puree. Pt will require 1:1 assist feed when caregiver is not present.     Education     Patient provided with verbal education regarding POC.  Understanding was verbalized.    Plan     Plan of Care reviewed with:  patient     Time Tracking     SLP Treatment Date:   03/05/24  Speech Start Time:  0900  Speech Stop Time:  0915     Speech Total Time (min):  15 min    Billable minutes:  Swallow and Oral Function Evaluation, 15 minutes     03/05/2024

## 2024-03-05 NOTE — PLAN OF CARE
03/05/24 1412   Discharge Assessment   Assessment Type Discharge Planning Assessment   Confirmed/corrected address, phone number and insurance Yes   Confirmed Demographics Correct on Facesheet   Source of Information patient   Reason For Admission Pneumonia   People in Home child(andrzej), adult   Facility Arrived From: Home   Do you expect to return to your current living situation? Yes   Do you have help at home or someone to help you manage your care at home? Yes   Who are your caregiver(s) and their phone number(s)? Daughter, Dena Wells, [phone]   Current cognitive status: Unable to Assess   Walking or Climbing Stairs Difficulty yes   Walking or Climbing Stairs ambulation difficulty, dependent;stair climbing difficulty, dependent;transferring difficulty, dependent   Mobility Management Requires lift to transfer, wheelchair bariatric chair, hospital bed   Dressing/Bathing Difficulty yes   Dressing/Bathing bathing difficulty, requires equipment;bathing difficulty, dependent   Dressing/Bathing Management Requires total care wtih all ADL's   Home Accessibility wheelchair accessible   Home Layout Able to live on 1st floor   Patient currently being followed by outpatient case management? No   Do you currently have service(s) that help you manage your care at home? No   Do you take prescription medications? Yes   Do you have prescription coverage? Yes   Coverage MCR   Do you have any problems affording any of your prescribed medications? No   Is the patient taking medications as prescribed? yes   Who is going to help you get home at discharge? DaughterDena   How do you get to doctors appointments? family or friend will provide;health plan transportation   Are you on dialysis? No   Do you take coumadin? No   Discharge Plan A Other  (TBD)   Discharge Plan B Other  (TBD)   DME Needed Upon Discharge  none   Discharge Plan discussed with: Adult children   Name(s) and Number(s) Amrik Wells  [phone]   Physical Activity   On average, how many days per week do you engage in moderate to strenuous exercise (like a brisk walk)? 0 days   On average, how many minutes do you engage in exercise at this level? 0 min   Financial Resource Strain   How hard is it for you to pay for the very basics like food, housing, medical care, and heating? Not hard   Housing Stability   In the last 12 months, was there a time when you were not able to pay the mortgage or rent on time? N   In the last 12 months, how many places have you lived? 2   In the last 12 months, was there a time when you did not have a steady place to sleep or slept in a shelter (including now)? N   Transportation Needs   In the past 12 months, has lack of transportation kept you from medical appointments or from getting medications? yes   In the past 12 months, has lack of transportation kept you from meetings, work, or from getting things needed for daily living? Yes   Food Insecurity   Within the past 12 months, you worried that your food would run out before you got the money to buy more. Never true   Within the past 12 months, the food you bought just didn't last and you didn't have money to get more. Never true   Stress   Do you feel stress - tense, restless, nervous, or anxious, or unable to sleep at night because your mind is troubled all the time - these days? Not at all   Social Connections   In a typical week, how many times do you talk on the phone with family, friends, or neighbors? Pt Unable   How often do you get together with friends or relatives? Pt Unable   How often do you attend Christian or Yazidism services? Never   Do you belong to any clubs or organizations such as Christian groups, unions, fraternal or athletic groups, or school groups? No   How often do you attend meetings of the clubs or organizations you belong to? Never   Are you , , , , never , or living with a partner?    Alcohol  Use   Q1: How often do you have a drink containing alcohol? Never   Q2: How many drinks containing alcohol do you have on a typical day when you are drinking? None   Q3: How often do you have six or more drinks on one occasion? Never   OTHER   Name(s) of People in Home Dena Wells     Patient admitted from home.  Patient requires total care.  Patient was being cared for by his spouse prior to her expiring at end of last year.  Patient is now living with his daughter, Dena Wlels, who provides full time care for him.  His PCP, Yudith Burnett, is still assisting with managing his care until his daughter can set him up with a local PCP.  Daughter states that she is able to provide transportation for him using a lift and wheelchair.  States she attempted medical transport several times and they failed to show up.  Daughter will provide transportation and care for patient when discharged back home.
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
## 2024-03-05 NOTE — NURSING
Nurses Note -- 4 Eyes      3/4/2024   10:29 PM      Skin assessed during: Admit      [] No Altered Skin Integrity Present    []Prevention Measures Documented      [x] Yes- Altered Skin Integrity Present or Discovered   [x] LDA Added if Not in Epic (Describe Wound)   [x] New Altered Skin Integrity was Present on Admit and Documented in LDA   [x] Wound Image Taken    Wound Care Consulted? Yes    Attending Nurse:  Rachael Salvador RN/Staff Member:  Vania

## 2024-03-05 NOTE — PLAN OF CARE
Problem: Adult Inpatient Plan of Care  Goal: Plan of Care Review  Outcome: Ongoing, Progressing  Goal: Patient-Specific Goal (Individualized)  Outcome: Ongoing, Progressing  Goal: Absence of Hospital-Acquired Illness or Injury  Outcome: Ongoing, Progressing  Goal: Optimal Comfort and Wellbeing  Outcome: Ongoing, Progressing  Intervention: Provide Person-Centered Care  Flowsheets (Taken 3/5/2024 0140)  Trust Relationship/Rapport:   care explained   choices provided   emotional support provided   empathic listening provided   questions answered   questions encouraged   reassurance provided   thoughts/feelings acknowledged     Problem: Infection  Goal: Absence of Infection Signs and Symptoms  Outcome: Ongoing, Progressing  Intervention: Prevent or Manage Infection  Flowsheets (Taken 3/5/2024 0140)  Isolation Precautions: precautions discontinued     Problem: Fall Injury Risk  Goal: Absence of Fall and Fall-Related Injury  Outcome: Ongoing, Progressing     Problem: Skin Injury Risk Increased  Goal: Skin Health and Integrity  Outcome: Ongoing, Progressing  Intervention: Optimize Skin Protection  Flowsheets (Taken 3/5/2024 0140)  Pressure Reduction Techniques:   heels elevated off bed   positioned off wounds   weight shift assistance provided  Skin Protection:   adhesive use limited   tubing/devices free from skin contact  Head of Bed (HOB) Positioning: HOB at 30-45 degrees     Problem: Impaired Wound Healing  Goal: Optimal Wound Healing  Outcome: Ongoing, Not Progressing  Intervention: Promote Wound Healing  Flowsheets (Taken 3/5/2024 0140)  Sleep/Rest Enhancement:   awakenings minimized   family presence promoted   regular sleep/rest pattern promoted   room darkened  Activity Management: Rolling - L1

## 2024-03-06 VITALS
HEIGHT: 71 IN | DIASTOLIC BLOOD PRESSURE: 87 MMHG | TEMPERATURE: 98 F | OXYGEN SATURATION: 96 % | RESPIRATION RATE: 20 BRPM | WEIGHT: 214.06 LBS | SYSTOLIC BLOOD PRESSURE: 137 MMHG | BODY MASS INDEX: 29.97 KG/M2 | HEART RATE: 96 BPM

## 2024-03-06 PROBLEM — R06.02 SOB (SHORTNESS OF BREATH): Status: ACTIVE | Noted: 2024-03-06

## 2024-03-06 LAB
ALBUMIN SERPL-MCNC: 2.7 G/DL (ref 3.4–4.8)
ALBUMIN/GLOB SERPL: 0.7 RATIO (ref 1.1–2)
ALP SERPL-CCNC: 162 UNIT/L (ref 40–150)
ALT SERPL-CCNC: 49 UNIT/L (ref 0–55)
AST SERPL-CCNC: 37 UNIT/L (ref 5–34)
BASOPHILS # BLD AUTO: 0.02 X10(3)/MCL
BASOPHILS NFR BLD AUTO: 0.3 %
BILIRUB SERPL-MCNC: 0.5 MG/DL
BUN SERPL-MCNC: 13 MG/DL (ref 8.4–25.7)
CALCIUM SERPL-MCNC: 8.1 MG/DL (ref 8.8–10)
CHLORIDE SERPL-SCNC: 105 MMOL/L (ref 98–107)
CO2 SERPL-SCNC: 23 MMOL/L (ref 23–31)
CREAT SERPL-MCNC: 0.73 MG/DL (ref 0.73–1.18)
EOSINOPHIL # BLD AUTO: 0.18 X10(3)/MCL (ref 0–0.9)
EOSINOPHIL NFR BLD AUTO: 2.6 %
ERYTHROCYTE [DISTWIDTH] IN BLOOD BY AUTOMATED COUNT: 14.6 % (ref 11.5–17)
GFR SERPLBLD CREATININE-BSD FMLA CKD-EPI: >60 MLS/MIN/1.73/M2
GLOBULIN SER-MCNC: 3.8 GM/DL (ref 2.4–3.5)
GLUCOSE SERPL-MCNC: 139 MG/DL (ref 82–115)
HCT VFR BLD AUTO: 38.2 % (ref 42–52)
HGB BLD-MCNC: 12.3 G/DL (ref 14–18)
IMM GRANULOCYTES # BLD AUTO: 0.03 X10(3)/MCL (ref 0–0.04)
IMM GRANULOCYTES NFR BLD AUTO: 0.4 %
LYMPHOCYTES # BLD AUTO: 1.06 X10(3)/MCL (ref 0.6–4.6)
LYMPHOCYTES NFR BLD AUTO: 15.2 %
MAGNESIUM SERPL-MCNC: 2.2 MG/DL (ref 1.6–2.6)
MCH RBC QN AUTO: 26.2 PG (ref 27–31)
MCHC RBC AUTO-ENTMCNC: 32.2 G/DL (ref 33–36)
MCV RBC AUTO: 81.4 FL (ref 80–94)
MONOCYTES # BLD AUTO: 0.63 X10(3)/MCL (ref 0.1–1.3)
MONOCYTES NFR BLD AUTO: 9 %
NEUTROPHILS # BLD AUTO: 5.05 X10(3)/MCL (ref 2.1–9.2)
NEUTROPHILS NFR BLD AUTO: 72.5 %
NRBC BLD AUTO-RTO: 0 %
PLATELET # BLD AUTO: 191 X10(3)/MCL (ref 130–400)
PMV BLD AUTO: 10.8 FL (ref 7.4–10.4)
POCT GLUCOSE: 173 MG/DL (ref 70–110)
POTASSIUM SERPL-SCNC: 4.1 MMOL/L (ref 3.5–5.1)
PROT SERPL-MCNC: 6.5 GM/DL (ref 5.8–7.6)
RBC # BLD AUTO: 4.69 X10(6)/MCL (ref 4.7–6.1)
SODIUM SERPL-SCNC: 138 MMOL/L (ref 136–145)
WBC # SPEC AUTO: 6.97 X10(3)/MCL (ref 4.5–11.5)

## 2024-03-06 PROCEDURE — 63600175 PHARM REV CODE 636 W HCPCS: Performed by: NURSE PRACTITIONER

## 2024-03-06 PROCEDURE — 94761 N-INVAS EAR/PLS OXIMETRY MLT: CPT

## 2024-03-06 PROCEDURE — 80053 COMPREHEN METABOLIC PANEL: CPT | Performed by: INTERNAL MEDICINE

## 2024-03-06 PROCEDURE — 63600175 PHARM REV CODE 636 W HCPCS: Performed by: INTERNAL MEDICINE

## 2024-03-06 PROCEDURE — 25000003 PHARM REV CODE 250: Performed by: NURSE PRACTITIONER

## 2024-03-06 PROCEDURE — 27000221 HC OXYGEN, UP TO 24 HOURS

## 2024-03-06 PROCEDURE — 85025 COMPLETE CBC W/AUTO DIFF WBC: CPT | Performed by: INTERNAL MEDICINE

## 2024-03-06 PROCEDURE — 83735 ASSAY OF MAGNESIUM: CPT | Performed by: INTERNAL MEDICINE

## 2024-03-06 RX ORDER — AMOXICILLIN AND CLAVULANATE POTASSIUM 875; 125 MG/1; MG/1
1 TABLET, FILM COATED ORAL EVERY 12 HOURS
Qty: 14 TABLET | Refills: 0 | Status: SHIPPED | OUTPATIENT
Start: 2024-03-06 | End: 2024-03-13

## 2024-03-06 RX ORDER — LOPERAMIDE HYDROCHLORIDE 2 MG/1
2 CAPSULE ORAL 4 TIMES DAILY PRN
Qty: 10 CAPSULE | Refills: 0 | Status: SHIPPED | OUTPATIENT
Start: 2024-03-06 | End: 2024-03-06

## 2024-03-06 RX ORDER — AMOXICILLIN AND CLAVULANATE POTASSIUM 875; 125 MG/1; MG/1
1 TABLET, FILM COATED ORAL EVERY 12 HOURS
Qty: 14 TABLET | Refills: 0 | Status: SHIPPED | OUTPATIENT
Start: 2024-03-06 | End: 2024-03-06

## 2024-03-06 RX ORDER — LOPERAMIDE HYDROCHLORIDE 2 MG/1
2 CAPSULE ORAL 4 TIMES DAILY PRN
Qty: 5 CAPSULE | Refills: 0 | Status: SHIPPED | OUTPATIENT
Start: 2024-03-06 | End: 2024-03-16

## 2024-03-06 RX ORDER — AMOXICILLIN AND CLAVULANATE POTASSIUM 250; 62.5 MG/5ML; MG/5ML
13.33 POWDER, FOR SUSPENSION ORAL 2 TIMES DAILY
Qty: 187 ML | Refills: 0 | Status: SHIPPED | OUTPATIENT
Start: 2024-03-06 | End: 2024-03-06 | Stop reason: HOSPADM

## 2024-03-06 RX ADMIN — LEVETIRACETAM INJECTION 1000 MG: 10 INJECTION INTRAVENOUS at 09:03

## 2024-03-06 RX ADMIN — PIPERACILLIN AND TAZOBACTAM 4.5 G: 4; .5 INJECTION, POWDER, LYOPHILIZED, FOR SOLUTION INTRAVENOUS; PARENTERAL at 12:03

## 2024-03-06 RX ADMIN — PIPERACILLIN AND TAZOBACTAM 4.5 G: 4; .5 INJECTION, POWDER, LYOPHILIZED, FOR SOLUTION INTRAVENOUS; PARENTERAL at 10:03

## 2024-03-06 NOTE — PLAN OF CARE
03/06/24 1135   Final Note   Assessment Type Final Discharge Note   Anticipated Discharge Disposition Home-Health   Hospital Resources/Appts/Education Provided Provided patient/caregiver with written discharge plan information;Appointments scheduled and added to AVS   Post-Acute Status   Post-Acute Authorization Home Health   Home Health Status Set-up Complete/Auth obtained   Patient choice form signed by patient/caregiver List with quality metrics by geographic area provided   Discharge Delays None known at this time     Patient [facility].  Patient list provided.  Patient choice signed.  Patient's daughter will provide discharge transportation.

## 2024-03-06 NOTE — PLAN OF CARE
Problem: Adult Inpatient Plan of Care  Goal: Plan of Care Review  Outcome: Met  Goal: Patient-Specific Goal (Individualized)  Outcome: Met  Goal: Absence of Hospital-Acquired Illness or Injury  Outcome: Met  Goal: Optimal Comfort and Wellbeing  Outcome: Met  Goal: Readiness for Transition of Care  Outcome: Met     Problem: Diabetes Comorbidity  Goal: Blood Glucose Level Within Targeted Range  Outcome: Met     Problem: Infection  Goal: Absence of Infection Signs and Symptoms  Outcome: Met     Problem: Fall Injury Risk  Goal: Absence of Fall and Fall-Related Injury  Outcome: Met     Problem: Skin Injury Risk Increased  Goal: Skin Health and Integrity  Outcome: Met     Problem: Impaired Wound Healing  Goal: Optimal Wound Healing  Outcome: Met

## 2024-03-06 NOTE — DISCHARGE SUMMARY
[facility] - 4th Floor [facility]  Discharge Summary      Patient Name: Tito Wells  MRN: [id number]  [facility]: [id number]  Patient Class: IP- Inpatient  Admission Date: 3/4/2024  Hospital Length of Stay: 2 days  Discharge Date and Time:  03/06/2024 9:09 AM  Attending Physician: Mann Dior MD   Discharging Provider: Mann Dior MD  Primary Care Provider: Yudith Burnett MD    Primary Care Team: Networked reference to record PCT       Tito Wells is a 68 y.o. 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 He is nonverbal and bed bound at baseline.  Upon presentation to ED, vital signed included /83, , RR 16, SpO2 94% on room air, temperature 99.5° F. Labs notable for WBC 12.6, sodium 135, CO2 22, glucose 233, , albumin 3.2, AST 44, ALT 69, .6.  Influenza and COVID-19 negative.  Urinalysis with trace protein.  CXR demonstrated poor inspiratory effort accentuates the pulmonary vascular markings, confluence opacities in the left retrocardiac region with partial silhouetting of the left hemidiaphragm which may be related to an infiltrate/atelectasis.  He was started on broad-spectrum IV antibiotics with vancomycin and Zosyn.  He was admitted to hospital medicine services for further medical management of possible aspiration pneumonia    Infectious workup as needed pain negative empiric antibiotics therapy was continued.  He made a good clinical recovery from respiratory status standpoint.  Speech has resumed modified diet and he tolerated well with no issues.  He had few loose stools during hospital and sample was negative for C diff.  Imodium was added to help with diarrhea.  Lower extremity Doppler was negative for DVT.  Ultrasound liver obtained due to abnormal LFTs showed cholelithiasis with no  biliary dilatation or cholecystitis.  Mild hepatomegaly was seen.  LFTs are improved during hospital stay.  He is currently medically stable for discharge to continue empiric Augmentin for few more days.  Encourage outpatient follow up with PCP.   will help with home health.  Home oxygen will be arranged if appropriate.      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  Service: Hospital Medicine    Final Active Diagnoses:    Diagnosis Date Noted POA    PRINCIPAL PROBLEM:  SOB (shortness of breath) [R06.02] 03/06/2024 Yes      Problems Resolved During this Admission:       Discharged Condition: good    Disposition: Home-Health Care [facility]    Follow Up:   Follow-up [facility]       [facility] Follow up.    Specialty: Home Health Services  Why: This is your home heatlh agency.  Call them for any questions or concerns  Contact information:  [address] [facility] [place] [address] [phone]                           Patient Instructions:      SUBSEQUENT HOME HEALTH ORDERS   Order Comments: Home Health evaluate and treat for all disciplines  Skilled nurse for management of disease process, medication management, and education.     Order Specific Question Answer Comments   What Home Health Agency is the patient currently using? Other/External        Significant Diagnostic Studies: Labs: CMP   Recent Labs   Lab 03/05/24  0429 03/06/24  0436    138   K 4.0 4.1   CO2 23 23   BUN 13.0 13.0    CREATININE 0.75 0.73   CALCIUM 8.5* 8.1*   ALBUMIN 2.8* 2.7*   BILITOT 0.8 0.5   ALKPHOS 169* 162*   AST 35* 37*   ALT 54 49       Pending Diagnostic Studies:       None           Medications:  Reconciled Home Medications:      Medication List        START taking these medications      amoxicillin-clavulanate 875-125mg 875-125 mg per tablet  Commonly known as: AUGMENTIN  Take 1 tablet by mouth every 12 (twelve) hours. for 7 days     loperamide 2 mg capsule  Commonly known as: IMODIUM  Take 1 capsule (2 mg total) by mouth 4 (four) times daily as needed for Diarrhea.            CONTINUE taking these medications      ergocalciferol 50,000 unit Cap  Commonly known as: ERGOCALCIFEROL  1 capsule (50,000 Units total) by Per G Tube route every 7 days.     hydroCHLOROthiazide 25 MG tablet  Commonly known as: HYDRODIURIL  25 mg by Per G Tube route once daily.     lansoprazole 30 MG disintegrating tablet  Commonly known as: PREVACID SOLUTAB  Take 30 mg by mouth once daily.     levETIRAcetam 100 mg/mL Soln  Commonly known as: KEPPRA  1,000 mg by Per G Tube route 2 (two) times daily.     melatonin 3 mg tablet  Commonly known as: MELATIN  2 tablets (6 mg total) by Per G Tube route nightly as needed for Insomnia.     polyethylene glycol 17 gram Pwpk  Commonly known as: GLYCOLAX  17 g by Per G Tube route 3 (three) times daily.     potassium bicarbonate disintegrating tablet  2 tablets (20 mEq total) by Per G Tube route once daily.     VITAMIN C 500 MG tablet  Generic drug: ascorbic acid (vitamin C)  Take 500 mg by mouth once daily.     XARELTO 10 mg Tab  Generic drug: rivaroxaban  10 mg by Per G Tube route once daily.     zinc gluconate 50 mg tablet  Take 50 mg by mouth once daily.              Indwelling Lines/Drains at time of discharge:   Lines/Drains/Airways       Drain  Duration             Male External Urinary Catheter 03/04/24 2208 Medium 1 day                    Time spent on the discharge of patient: 35 minutes          Mann Dior MD  Department of Hospital Medicine  [facility] - 4th Floor Medical Telemetry

## 2024-03-06 NOTE — PLAN OF CARE
03/06/24 0924   Medicare Message   Important Message from Medicare regarding Discharge Appeal Rights Given to patient/caregiver;Explained to patient/caregiver

## 2024-03-06 NOTE — PLAN OF CARE
Problem: Adult Inpatient Plan of Care  Goal: Optimal Comfort and Wellbeing  Outcome: Ongoing, Progressing     Problem: Diabetes Comorbidity  Goal: Blood Glucose Level Within Targeted Range  Outcome: Ongoing, Progressing     Problem: Infection  Goal: Absence of Infection Signs and Symptoms  Outcome: Ongoing, Progressing     Problem: Fall Injury Risk  Goal: Absence of Fall and Fall-Related Injury  Outcome: Ongoing, Progressing

## 2024-03-07 ENCOUNTER — PATIENT OUTREACH (OUTPATIENT)
Dept: ADMINISTRATIVE | Facility: CLINIC | Age: 69
End: 2024-03-07
Payer: MEDICARE

## 2024-03-07 NOTE — PROGRESS NOTES
C3 nurse attempted to contact Tito Wells  for a TCC post hospital discharge follow up call. No answer. Left voicemail with callback information. The patient has a scheduled HOSFU appointment with Yamile Berry MD Mar 13, 2024 11:00 AM

## 2024-03-08 NOTE — PROGRESS NOTES
C3 nurse attempted to contact Tito Wells  for a TCC post hospital discharge follow up call. No answer. No voicemail available. The patient has a scheduled HOSFU appointment with Yamile Berry MD Mar 13, 2024 11:00 AM

## 2024-03-09 LAB
BACTERIA BLD CULT: NORMAL
BACTERIA BLD CULT: NORMAL

## 2024-03-12 NOTE — PHYSICIAN QUERY
PT Name: Tito Wells  MR #: [id number]     DOCUMENTATION CLARIFICATION     CDS: Sheridan Pinzon RN, CCDS   Contact Information: [email or web address]    This form is a permanent document in the medical record.     Query Date: March 12, 2024    By submitting this query, we are merely seeking further clarification of documentation.  Please utilize your independent clinical judgment when addressing the question(s) below.  The Medical Record contains the following   Indicators   Supporting Clinical Findings Location in Medical Record   x Documentation of Respiratory Failure, ARDS Acute hypoxemic respiratory failure  3/5/24-3/6/24 Hospital Medicine    Subjective Respiratory Signs/Symptoms     x Objective Respiratory Signs/Symptoms Nonlabored breathing  In no acute distress  No clubbing or cyanosis   3/5/24 Hospital Medicine   x RR     O2 sat     O2 use 3/4/24 1201: R 16, SpO2 95% on Room Air  3/4/24 1301: R 37, SpO2 93% no oxygen documented  3/4/24 1901: R 25, SpO2 94% on 3L via Nasal Cannula  3/5/24 0752: R 18, SpO2 97% no oxygen documented  3/6/24 0710: R ---, SpO2 96% no oxygen documented Flowsheets   x Acute/Chronic Illness 68 y.o. 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   3/4/24 H&P   x Treatment Currently saturating about 90% with 3 L nasal cannula oxygen  3/5/24 Hospital Medicine   x Other Upon presentation to ED...RR 16, SpO2 94% on room air  3/4/24 H&P         The clinical guidelines noted are only a system guideline. It does not replace the providers clinical judgment.    [facility] Approved Diagnostic Criteria      Acute Respiratory Failure    Hypoxic: ABG pO2<60 mmHg or O2 sat of <91% on RA   AND/OR   Hypercapnic: ABG pCO2>50 mmHg with pH <7.35   AND   Respiratory symptoms documented (Subjective: SOB; Objective: Tachypnea, respiratory  distress, increased work of breathing, unable to speak in complete sentences, labored breathing, use of accessory muscles, RR>26, cyanosis, dyspnea, wheezing, stridor, lethargy)      Chronic Respiratory Failure   Hypoxic: Continuous home oxygen    AND/OR   Hypercapnic: Normal pH with high CO2 (ex. COPD)      Acute on Chronic Respiratory Failure   Hypoxic: ABG pO2>10mmHg below baseline OR ABG pO2<60 mmHg OR SpO2<91% on usual home O2  OR O2>2L/min over baseline home O2   AND/OR   Hypercapnic: ABG pCO2>50 mmHg OR pCO2>10mmHg over baseline and pH <7.35   AND   Respiratory symptoms documented      Acute Respiratory Distress Syndrome (ARDS) - an acute, diffuse, inflammatory form of lung injury. Suspect with progressive dyspnea, hypoxemic respiratory failure, and bilateral alveolar infiltrates on chest imaging within 6 to 72 hours of an inciting event.   Acute Respiratory Distress - Generally describes less severe respiratory symptoms (tachypnea, in respiratory distress, increased work of breathing, unable to speak in complete sentences, labored breathing, use of accessory muscles, RR> 24, cyanosis, dyspnea, wheezing, stridor, lethargy) without sufficient measurements (pO2, SpO2, pH, and pCO2) to meet criteria for respiratory failure)   Acute Respiratory Insufficiency - Generally describes less severe respiratory symptoms and measurements (pO2, SpO2, pH, and pCO2) not meeting criteria for respiratory failure         Due to the conflicting clinical picture, please clinically validate the diagnosis of Acute hypoxemic respiratory failure.    If validated, please provide additional clinical support for the diagnosis.     [  x  ] Acute hypoxemic respiratory failure has been ruled out   [    ] Acute hypoxemic respiratory failure has been ruled out, other diagnosis ruled in:   [   ] Acute Respiratory Insufficiency   [   ] Hypoxia only   [   ] Other (please specify):__________________   [    ] Acute Respiratory Failure  with Hypoxia diagnosis is confirmed and additional clinical support/decision-making indicators for the diagnosis include (please specify):_______________________________________________   [    ] Other clarification (please specify): ___________________     Please document in your progress notes daily for the duration of treatment until resolved and include in your discharge summary.     Reference:    AARON Carpenter MD. (2020, March 13). Acute respiratory distress syndrome: Clinical features, diagnosis, and complications in adults ([id number] ANTONIO Camacho MD & [id number] RIKY Bay MD, Eds.). Retrieved November 13, 2020, from [email or web address]&source=search_result&selectedTitle=1~[email or web address]&usage_type=default&display_rank=1  Form No. [id number]

## 2024-04-17 DIAGNOSIS — R56.9 SEIZURE: Primary | ICD-10-CM

## 2024-04-19 ENCOUNTER — LAB REQUISITION (OUTPATIENT)
Dept: LAB | Facility: HOSPITAL | Age: 69
End: 2024-04-19
Payer: MEDICARE

## 2024-04-19 DIAGNOSIS — E11.9 TYPE 2 DIABETES MELLITUS WITHOUT COMPLICATIONS: ICD-10-CM

## 2024-04-19 DIAGNOSIS — J96.01 ACUTE RESPIRATORY FAILURE WITH HYPOXIA: ICD-10-CM

## 2024-04-19 DIAGNOSIS — G40.909 EPILEPSY, UNSPECIFIED, NOT INTRACTABLE, WITHOUT STATUS EPILEPTICUS: ICD-10-CM

## 2024-04-19 LAB
ALBUMIN SERPL-MCNC: 3.3 G/DL (ref 3.4–4.8)
ALBUMIN/GLOB SERPL: 0.8 RATIO (ref 1.1–2)
ALP SERPL-CCNC: 212 UNIT/L (ref 40–150)
ALT SERPL-CCNC: 47 UNIT/L (ref 0–55)
AST SERPL-CCNC: 35 UNIT/L (ref 5–34)
BASOPHILS # BLD AUTO: 0.04 X10(3)/MCL
BASOPHILS NFR BLD AUTO: 0.7 %
BILIRUB SERPL-MCNC: 0.3 MG/DL
BUN SERPL-MCNC: 18.8 MG/DL (ref 8.4–25.7)
CALCIUM SERPL-MCNC: 9 MG/DL (ref 8.8–10)
CHLORIDE SERPL-SCNC: 102 MMOL/L (ref 98–107)
CO2 SERPL-SCNC: 23 MMOL/L (ref 23–31)
CREAT SERPL-MCNC: 0.85 MG/DL (ref 0.73–1.18)
DEPRECATED CALCIDIOL+CALCIFEROL SERPL-MC: 67.2 NG/ML (ref 30–80)
EOSINOPHIL # BLD AUTO: 0.22 X10(3)/MCL (ref 0–0.9)
EOSINOPHIL NFR BLD AUTO: 3.8 %
ERYTHROCYTE [DISTWIDTH] IN BLOOD BY AUTOMATED COUNT: 15.1 % (ref 11.5–17)
EST. AVERAGE GLUCOSE BLD GHB EST-MCNC: 154.2 MG/DL
GFR SERPLBLD CREATININE-BSD FMLA CKD-EPI: >60 MLS/MIN/1.73/M2
GLOBULIN SER-MCNC: 4 GM/DL (ref 2.4–3.5)
GLUCOSE SERPL-MCNC: 230 MG/DL (ref 82–115)
HBA1C MFR BLD: 7 %
HCT VFR BLD AUTO: 43.6 % (ref 42–52)
HGB BLD-MCNC: 13.8 G/DL (ref 14–18)
IMM GRANULOCYTES # BLD AUTO: 0.01 X10(3)/MCL (ref 0–0.04)
IMM GRANULOCYTES NFR BLD AUTO: 0.2 %
LYMPHOCYTES # BLD AUTO: 1.48 X10(3)/MCL (ref 0.6–4.6)
LYMPHOCYTES NFR BLD AUTO: 25.7 %
MCH RBC QN AUTO: 25.5 PG (ref 27–31)
MCHC RBC AUTO-ENTMCNC: 31.7 G/DL (ref 33–36)
MCV RBC AUTO: 80.4 FL (ref 80–94)
MONOCYTES # BLD AUTO: 0.28 X10(3)/MCL (ref 0.1–1.3)
MONOCYTES NFR BLD AUTO: 4.9 %
NEUTROPHILS # BLD AUTO: 3.73 X10(3)/MCL (ref 2.1–9.2)
NEUTROPHILS NFR BLD AUTO: 64.7 %
NRBC BLD AUTO-RTO: 0 %
PLATELET # BLD AUTO: 200 X10(3)/MCL (ref 130–400)
PMV BLD AUTO: 11.9 FL (ref 7.4–10.4)
POTASSIUM SERPL-SCNC: 4.2 MMOL/L (ref 3.5–5.1)
PROT SERPL-MCNC: 7.3 GM/DL (ref 5.8–7.6)
RBC # BLD AUTO: 5.42 X10(6)/MCL (ref 4.7–6.1)
SODIUM SERPL-SCNC: 137 MMOL/L (ref 136–145)
TSH SERPL-ACNC: 1.97 UIU/ML (ref 0.35–4.94)
WBC # SPEC AUTO: 5.76 X10(3)/MCL (ref 4.5–11.5)

## 2024-04-19 PROCEDURE — 84443 ASSAY THYROID STIM HORMONE: CPT | Performed by: NURSE PRACTITIONER

## 2024-04-19 PROCEDURE — 85025 COMPLETE CBC W/AUTO DIFF WBC: CPT | Performed by: NURSE PRACTITIONER

## 2024-04-19 PROCEDURE — 82306 VITAMIN D 25 HYDROXY: CPT | Performed by: NURSE PRACTITIONER

## 2024-04-19 PROCEDURE — 80053 COMPREHEN METABOLIC PANEL: CPT | Performed by: NURSE PRACTITIONER

## 2024-04-19 PROCEDURE — 83036 HEMOGLOBIN GLYCOSYLATED A1C: CPT | Performed by: NURSE PRACTITIONER

## 2025-08-23 ENCOUNTER — PATIENT MESSAGE (OUTPATIENT)
Dept: RESEARCH | Facility: HOSPITAL | Age: 70
End: 2025-08-23
Payer: MEDICARE